# Patient Record
Sex: FEMALE | Race: WHITE | NOT HISPANIC OR LATINO | Employment: OTHER | URBAN - METROPOLITAN AREA
[De-identification: names, ages, dates, MRNs, and addresses within clinical notes are randomized per-mention and may not be internally consistent; named-entity substitution may affect disease eponyms.]

---
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
## 2023-04-15 PROBLEM — S22.49XA MULTIPLE RIB FRACTURES: Status: ACTIVE | Noted: 2023-04-15

## 2023-04-15 PROBLEM — S42.009A CLAVICLE FRACTURE: Status: ACTIVE | Noted: 2023-04-15

## 2023-04-16 PROBLEM — S27.0XXA PNEUMOTHORAX, TRAUMATIC: Status: ACTIVE | Noted: 2023-04-16

## 2023-04-17 PROBLEM — S27.0XXA PNEUMOTHORAX, TRAUMATIC: Status: RESOLVED | Noted: 2023-04-16 | Resolved: 2023-04-17

## 2023-04-17 PROBLEM — E83.39 HYPOPHOSPHATEMIA: Status: ACTIVE | Noted: 2023-04-17

## 2023-04-19 ENCOUNTER — TELEPHONE (OUTPATIENT)
Dept: ADMINISTRATIVE | Facility: CLINIC | Age: 68
End: 2023-04-19

## 2023-04-19 NOTE — PROGRESS NOTES
"Phoned patient in response to reply of "2" to post-discharge texting tracker. Ms. Todd did not receive 2 of the medications prescribed at discharge; the lidocaine patches and the oxycodone. Phoned the [facility] in [place], [place] and was told that they cannot, by law, fill any out of state prescriptions for controlled substance medications. The C-II's must be printed on a triplicate form and prescribed by a practitioner licensed in the state of [place]. The lidocaine patches require a prior auth from the insurance company. Sent messages to Dr. Sainz to request the RX be e-prescribed to the [facility] on [facility] in [place] at the patient's request. Dr. Sainz replied that he would look into the oxycodone prescription. Also sent messages to the  and prior auth dept to see if prior auth could be obtained for the lidocaine patches. The  could not assist. Still waiting for a reply from nurse in prior auth dept.   Dr. Sainz re-routed the oxycodone and lidocaine prescriptions to the [facility] in [place] on [facility]. Mrs. Todd was called and updated that both prescriptions were sent, but there might be a delay in getting the lidocaine patches as they have to be approved. The prior auth dept will inform patient with outcome.  "